# Patient Record
Sex: FEMALE | Race: WHITE | URBAN - METROPOLITAN AREA
[De-identification: names, ages, dates, MRNs, and addresses within clinical notes are randomized per-mention and may not be internally consistent; named-entity substitution may affect disease eponyms.]

---

## 2022-04-07 PROCEDURE — 88312 SPECIAL STAINS GROUP 1: CPT

## 2022-04-07 PROCEDURE — 88305 TISSUE EXAM BY PATHOLOGIST: CPT

## 2022-04-08 ENCOUNTER — HOSPITAL ENCOUNTER (OUTPATIENT)
Dept: LAB | Age: 27
Discharge: HOME | End: 2022-04-08

## 2022-04-21 ENCOUNTER — TRANSCRIBE ORDER (OUTPATIENT)
Dept: SCHEDULING | Age: 27
End: 2022-04-21

## 2022-04-21 DIAGNOSIS — R11.2 NAUSEA WITH VOMITING, UNSPECIFIED: Primary | ICD-10-CM

## 2022-07-12 ENCOUNTER — OFFICE VISIT (OUTPATIENT)
Dept: OCCUPATIONAL MEDICINE | Age: 27
End: 2022-07-12

## 2022-07-12 DIAGNOSIS — K21.9 GASTROESOPHAGEAL REFLUX DISEASE WITHOUT ESOPHAGITIS: Primary | ICD-10-CM

## 2022-07-12 DIAGNOSIS — Z30.9 ENCOUNTER FOR CONTRACEPTIVE MANAGEMENT, UNSPECIFIED TYPE: ICD-10-CM

## 2022-07-12 PROCEDURE — 99213 OFFICE O/P EST LOW 20 MIN: CPT | Performed by: NURSE PRACTITIONER

## 2022-07-12 RX ORDER — PANTOPRAZOLE SODIUM 40 MG/1
40 TABLET, DELAYED RELEASE ORAL 2 TIMES DAILY WITH MEALS
Qty: 60 TABLET | Refills: 5 | Status: SHIPPED | OUTPATIENT
Start: 2022-07-12 | End: 2023-01-08

## 2022-07-12 RX ORDER — PANTOPRAZOLE SODIUM 40 MG/1
40 TABLET, DELAYED RELEASE ORAL DAILY
COMMUNITY
Start: 2022-01-23 | End: 2022-07-12 | Stop reason: SDUPTHER

## 2022-07-18 ASSESSMENT — ENCOUNTER SYMPTOMS
SHORTNESS OF BREATH: 0
ABDOMINAL PAIN: 0

## 2022-07-19 NOTE — PROGRESS NOTES
PROGRESS NOTE    SUBJECTIVE:   Arlet Sarah is a 28 y.o. female seen for a follow up visit regarding Medication Refill     Pt needs med refill. Stable. No issues. Given PCP names to follow up for annual.  Given GYN names for follow up for annual.      Past Medical History, Past Surgical History, Family history, Social History, and Medications were all reviewed with the patient today and updated as necessary. Current Outpatient Medications   Medication Sig Dispense Refill    norethindrone-ethinyl estradiol-Fe (LO LOESTRIN FE) 1 MG-10 MCG / 10 MCG tablet Take 1 tablet by mouth daily 1 packet 11    pantoprazole (PROTONIX) 40 MG tablet Take 1 tablet by mouth 2 times daily (with meals) 60 tablet 5    ondansetron (ZOFRAN-ODT) 8 MG TBDP disintegrating tablet Take 8 mg by mouth every 8 hours as needed      sertraline (ZOLOFT) 25 MG tablet Take 25 mg by mouth daily       No current facility-administered medications for this visit. Not on File  There is no problem list on file for this patient. No past medical history on file. No past surgical history on file. No family history on file. Social History     Tobacco Use    Smoking status: Never    Smokeless tobacco: Never   Substance Use Topics    Alcohol use: Yes       Review of Systems   Eyes:  Negative for visual disturbance. Respiratory:  Negative for shortness of breath. Cardiovascular:  Negative for chest pain, palpitations and leg swelling. Gastrointestinal:  Negative for abdominal pain. Neurological:  Negative for dizziness, syncope and light-headedness. OBJECTIVE:  There were no vitals taken for this visit. Physical Exam  Constitutional:       Appearance: Normal appearance. Cardiovascular:      Rate and Rhythm: Normal rate and regular rhythm. Pulmonary:      Effort: Pulmonary effort is normal.      Breath sounds: Normal breath sounds. Neurological:      Mental Status: She is alert.           ASSESSMENT and Dee Vergara was seen today for medication refill. Diagnoses and all orders for this visit:    Gastroesophageal reflux disease without esophagitis    Encounter for contraceptive management, unspecified type    Other orders  -     norethindrone-ethinyl estradiol-Fe (LO LOESTRIN FE) 1 MG-10 MCG / 10 MCG tablet; Take 1 tablet by mouth daily  -     pantoprazole (PROTONIX) 40 MG tablet; Take 1 tablet by mouth 2 times daily (with meals)        Current treatment plan is effective, no change in therapy. Reviewed diet, exercise and weight control. Reviewed potential future medication changes and side effects. RTC as needed.

## 2022-08-04 ENCOUNTER — TELEMEDICINE (OUTPATIENT)
Dept: PRIMARY CARE CLINIC | Facility: CLINIC | Age: 27
End: 2022-08-04
Payer: COMMERCIAL

## 2022-08-04 DIAGNOSIS — F90.2 ATTENTION DEFICIT HYPERACTIVITY DISORDER (ADHD), COMBINED TYPE: ICD-10-CM

## 2022-08-04 DIAGNOSIS — F51.01 PRIMARY INSOMNIA: ICD-10-CM

## 2022-08-04 DIAGNOSIS — Z76.89 ENCOUNTER TO ESTABLISH CARE: Primary | ICD-10-CM

## 2022-08-04 DIAGNOSIS — Z79.899 ENCOUNTER FOR LONG-TERM (CURRENT) USE OF MEDICATIONS: ICD-10-CM

## 2022-08-04 PROCEDURE — 99203 OFFICE O/P NEW LOW 30 MIN: CPT | Performed by: FAMILY MEDICINE

## 2022-08-04 RX ORDER — ESZOPICLONE 3 MG/1
3 TABLET, FILM COATED ORAL NIGHTLY PRN
Qty: 30 TABLET | Refills: 5 | Status: SHIPPED | OUTPATIENT
Start: 2022-08-04 | End: 2022-09-03

## 2022-08-04 RX ORDER — ATOMOXETINE 80 MG/1
80 CAPSULE ORAL DAILY
Qty: 30 CAPSULE | Refills: 5 | Status: SHIPPED | OUTPATIENT
Start: 2022-08-04 | End: 2022-08-21 | Stop reason: SINTOL

## 2022-08-04 SDOH — ECONOMIC STABILITY: FOOD INSECURITY: WITHIN THE PAST 12 MONTHS, YOU WORRIED THAT YOUR FOOD WOULD RUN OUT BEFORE YOU GOT MONEY TO BUY MORE.: NEVER TRUE

## 2022-08-04 SDOH — ECONOMIC STABILITY: FOOD INSECURITY: WITHIN THE PAST 12 MONTHS, THE FOOD YOU BOUGHT JUST DIDN'T LAST AND YOU DIDN'T HAVE MONEY TO GET MORE.: NEVER TRUE

## 2022-08-04 ASSESSMENT — PATIENT HEALTH QUESTIONNAIRE - PHQ9
SUM OF ALL RESPONSES TO PHQ QUESTIONS 1-9: 0
1. LITTLE INTEREST OR PLEASURE IN DOING THINGS: 0
2. FEELING DOWN, DEPRESSED OR HOPELESS: 0
SUM OF ALL RESPONSES TO PHQ9 QUESTIONS 1 & 2: 0

## 2022-08-04 ASSESSMENT — SOCIAL DETERMINANTS OF HEALTH (SDOH): HOW HARD IS IT FOR YOU TO PAY FOR THE VERY BASICS LIKE FOOD, HOUSING, MEDICAL CARE, AND HEATING?: NOT HARD AT ALL

## 2022-08-04 NOTE — PROGRESS NOTES
Brandon Owusu M.D.  [address] [facility]  [place]  Phone:  [phone]  Fax:  [phone]          I was in the office while conducting this encounter. The patient was at home. CHIEF COMPLAINT:  Chief Complaint   Patient presents with    New Patient     Patient recently moved here from [facility]. 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 ADHD     Pt feels like she has had add since childhood. She feels she has been mis-diagnosed in the past and treated for anxiety and depression and those medications were never helpful for her. She is not able to multitask, she has a hard time focusing and is not able to complete any task at home or work. Insomnia     Pt c/o severe insomnia x5 years. Wakes up 10-15 times per night. Has tried Melatonin, trazodone, anxiety meds and a small dose of lunesta. HISTORY OF PRESENT ILLNESS:  Ms. Asad Moreland is a 28 y.o. female  who presents as a new patient. She recently moved here from [facility]. 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 She believes that she had ADHD. She believes she had it as a child as well. She has never been treated. She was told that she had anxiety and depression and was given anxiolytics and antidepressants. None of those helped. She has trouble focusing and finishing her tasks at work. This causes her anxiety and frustration. She feels like she is bouncing from one task to another. She may start to do laundry, go to the refrigerator to get something and realize it needs to be cleaned so start doing that. She also has problems with insomnia. Patient says she wakes up 10 to 15 times at night. She only gets about 3 hours of sleep each night. She has tried  Melatonin, Trazodone and Lunesta. She remembers the [facility] helping some. No other complaints. Taking medications as prescribed. Medications reviewed.      HISTORY:  No Known Allergies      REVIEW OF SYSTEMS:  Review of systems is as indicated in HPI otherwise negative.     PHYSICAL EXAM:    Vital Signs: (As obtained by patient/caregiver at home)  Patient-Reported Vitals 8/4/2022   Patient-Reported Weight 104 lbs   Patient-Reported Height -   Patient-Reported Systolic -   Patient-Reported Diastolic -           Constitutional: [x] Appears well-developed and well-nourished [x] No apparent distress      [] Abnormal -     Mental status: [x] Alert and awake  [x] Oriented to person/place/time [x] Able to follow commands    [] Abnormal -     Eyes:   EOM    [x]  Normal    [] Abnormal -   Sclera  [x]  Normal    [] Abnormal -          Discharge [x]  None visible   [] Abnormal -     HENT: [x] Normocephalic, atraumatic  [] Abnormal -   [x] Mouth/Throat: Mucous membranes are moist    External Ears [x] Normal  [] Abnormal -    Neck: [x] No visualized mass [] Abnormal -     Pulmonary/Chest: [x] Respiratory effort normal   [x] No visualized signs of difficulty breathing or respiratory distress             Musculoskeletal:   [x] Normal gait with no signs of ataxia         [x] Normal range of motion of neck        [] Abnormal -     Neurological:        [x] No Facial Asymmetry (Cranial nerve 7 motor function) (limited exam due to video visit)          [x] No gaze palsy        [] Abnormal -          Skin:        [x] No significant exanthematous lesions or discoloration noted on facial skin         [] Abnormal -            Psychiatric:       [x] Normal Affect [] Abnormal -        [x] No Hallucinations    PHQ:  PHQ-9  8/4/2022   Little interest or pleasure in doing things 0   Little interest or pleasure in doing things -   Feeling down, depressed, or hopeless 0   Trouble falling or staying asleep, or sleeping too much -   Feeling tired or having little energy -   Poor appetite, weight loss, or overeating -   Feeling bad about yourself - or that you are a failure or have let yourself or your family down - Trouble concentrating on things such as school, work, reading, or watching TV -   Moving or speaking so slowly that other people could have noticed; or the opposite being so fidgety that others notice -   Thoughts of being better off dead, or hurting yourself in some way -   PHQ-2 Score 0   Total Score PHQ 2 -   PHQ-9 Total Score 0   PHQ 9 Score -   How difficult have these problems made it for you to do your work, take care of your home and get along with others -       LABS  No results found for this visit on 08/04/22. No results found for any previous visit. IMPRESSION/PLAN     Diagnosis Orders   1. Encounter to establish care        2. Primary insomnia  eszopiclone 3 MG TABS      3. Attention deficit hyperactivity disorder (ADHD), combined type  atomoxetine (STRATTERA) 80 MG capsule      4. Encounter for long-term (current) use of medications            Follow up and Dispositions:  Return in about 4 months (around 12/4/2022). Patient will continue current medications. Will add the following medications: Strattera 80 mg daily for ADHD and Lunesta 3 mg daily for insomnia. Reviewed medications and side effects in detail. Reviewed most recent labs. Reviewed diet, exercise and weight control. [facility] PRESCRIPTION DRUG MONITORING SEARCH DONE. PATIENT IS IN COMPLIANCE. Consent: This patient and/or their healthcare decision maker is aware that this patient-initiated Telehealth encounter is a billable service, with coverage as determined by their insurance carrier. Patient is aware that they may receive a bill and has provided verbal consent to proceed: Yes     The patient is being evaluated by a Virtual Visit (video visit) encounter to address concerns as mentioned above. A caregiver was present when appropriate.  Due to this being a TeleHealth encounter (During [place] [address] public health emergency), evaluation of the following organ systems was limited: Vitals/Constitutional/EENT/Resp/CV/GI//MS/Neuro/Skin/Heme-Lymph-Imm. Pursuant to the emergency declaration under the [address] [place] [address] and the [employer] and [employer] Act, this Virtual Visit was conducted with patient's (and/or legal guardian's) consent, to reduce the patient's risk of exposure to COVID-19 and provide necessary medical care. The patient (and/or legal guardian) has also been advised to contact this office for worsening conditions or problems, and seek emergency medical treatment and/or call 911 if deemed necessary. Patient identification was verified at the start of the visit: YES    Services were provided through a video synchronous discussion virtually to substitute for in-person clinic visit. Patient and provider were located at their individual homes. Jose Armando Gan, was evaluated through a synchronous (real-time) audio-video encounter. The patient (or guardian if applicable) is aware that this is a billable service, which includes applicable co-pays. This Virtual Visit was conducted with patient's (and/or legal guardian's) consent. The visit was conducted pursuant to the emergency declaration under the [address] [place] [address] and the [employer] Act. Patient identification was verified, and a caregiver was present when appropriate. The patient was located at Home: [facility]. Provider was located at [address]): [place] [address].. Total Time: minutes: 11-20 minutes. Dictated using voice recognition software.  Proofread, but unrecognized voice recognition errors may exist.    Catrachita Horton MD  08/04/22

## 2022-08-15 ENCOUNTER — TELEPHONE (OUTPATIENT)
Dept: PRIMARY CARE CLINIC | Facility: CLINIC | Age: 27
End: 2022-08-15

## 2022-08-16 NOTE — TELEPHONE ENCOUNTER
Patient states that Margoth Ambriz is causing severe nausea and vomiting, Zofran is somewhat helpful but she does not want to have to take that everyday. Patient states the medication was starting to help her symptoms but cant tolerate the side effect. Is there a different medication she could try?   [facility], [facility]

## 2022-08-18 NOTE — TELEPHONE ENCOUNTER
Can try a low dose of Vyvanse but she will need to come in and sign the controlled substance agreement form.

## 2022-08-21 DIAGNOSIS — F90.2 ATTENTION DEFICIT HYPERACTIVITY DISORDER (ADHD), COMBINED TYPE: Primary | ICD-10-CM

## 2022-08-23 ENCOUNTER — TELEPHONE (OUTPATIENT)
Dept: PRIMARY CARE CLINIC | Facility: CLINIC | Age: 27
End: 2022-08-23

## 2022-08-23 NOTE — TELEPHONE ENCOUNTER
Patient states the pharmacy told her she could not  her Vyvance- that there is a one month hold on this medication. Advised patient to call pharmacy back for more information regarding this.

## 2022-09-26 ENCOUNTER — TELEPHONE (OUTPATIENT)
Dept: PRIMARY CARE CLINIC | Facility: CLINIC | Age: 27
End: 2022-09-26

## 2022-09-27 ENCOUNTER — TELEPHONE (OUTPATIENT)
Dept: PRIMARY CARE CLINIC | Facility: CLINIC | Age: 27
End: 2022-09-27

## 2022-09-27 DIAGNOSIS — F90.2 ATTENTION DEFICIT HYPERACTIVITY DISORDER (ADHD), COMBINED TYPE: Primary | ICD-10-CM

## 2022-09-27 NOTE — TELEPHONE ENCOUNTER
Patient had a visit last month and was prescribed vyvanse. She said it is working great. She was told to let you know if it works and you would send in more refills.  Requesting it be sent to [facility]

## 2022-12-06 ENCOUNTER — TELEPHONE (OUTPATIENT)
Dept: PRIMARY CARE CLINIC | Facility: CLINIC | Age: 27
End: 2022-12-06

## 2022-12-06 DIAGNOSIS — F90.2 ATTENTION DEFICIT HYPERACTIVITY DISORDER (ADHD), COMBINED TYPE: ICD-10-CM

## 2022-12-06 DIAGNOSIS — F51.01 PRIMARY INSOMNIA: Primary | ICD-10-CM

## 2022-12-06 RX ORDER — ESZOPICLONE 3 MG/1
TABLET, FILM COATED ORAL
Qty: 30 TABLET | Refills: 5 | Status: SHIPPED | OUTPATIENT
Start: 2022-12-06 | End: 2023-01-05

## 2022-12-06 RX ORDER — ESZOPICLONE 3 MG/1
TABLET, FILM COATED ORAL
COMMUNITY
Start: 2022-11-04 | End: 2022-12-06 | Stop reason: SDUPTHER

## 2022-12-20 ENCOUNTER — TELEPHONE (OUTPATIENT)
Dept: PRIMARY CARE CLINIC | Facility: CLINIC | Age: 27
End: 2022-12-20

## 2022-12-20 NOTE — TELEPHONE ENCOUNTER
Called Desean Crum to reschedule appt - she did not answer so I left a message for her to call back        ----- Message from Evelin Fontaine sent at 12/19/2022  4:31 PM EST -----  Subject: Appointment Request    Reason for Call: Established Patient Appointment needed: Routine Existing   Condition Follow Up    QUESTIONS    Reason for appointment request? Available appointments did not meet   patient need     Additional Information for Provider? Patient has to r/s upcoming   appointment tomorrow 12/20/2022 due to attending two funerals. Patient   would like schedule a follow-up for medication that was prescribed   6-months. However, she will not have enough meds to last until the time we   have available 1/23/2023.  Available Tuesday and Friday anytime, screened   green  ---------------------------------------------------------------------------  --------------  Kadie Bolivar INFO  [phone]; OK to leave message on voicemail  ---------------------------------------------------------------------------  --------------  SCRIPT ANSWERS  COVID Screen: Rodo Mohan

## 2022-12-28 ENCOUNTER — TELEPHONE (OUTPATIENT)
Dept: PRIMARY CARE CLINIC | Facility: CLINIC | Age: 27
End: 2022-12-28

## 2022-12-28 NOTE — TELEPHONE ENCOUNTER
Pt has appt on 1/10 but will be out of lunesta and vyvanse.  She is requesting a refill to help her make it to the appt

## 2022-12-29 NOTE — TELEPHONE ENCOUNTER
Left message to call back. Looks like arlene has 5 refills. She may short a few days on the vyvanse.  Waiting on patient to call back

## 2023-01-10 ENCOUNTER — TELEMEDICINE (OUTPATIENT)
Dept: PRIMARY CARE CLINIC | Facility: CLINIC | Age: 28
End: 2023-01-10
Payer: COMMERCIAL

## 2023-01-10 DIAGNOSIS — F90.2 ATTENTION DEFICIT HYPERACTIVITY DISORDER (ADHD), COMBINED TYPE: Primary | ICD-10-CM

## 2023-01-10 DIAGNOSIS — Z79.899 ENCOUNTER FOR LONG-TERM (CURRENT) USE OF MEDICATIONS: ICD-10-CM

## 2023-01-10 PROCEDURE — 99213 OFFICE O/P EST LOW 20 MIN: CPT | Performed by: FAMILY MEDICINE

## 2023-01-10 NOTE — PROGRESS NOTES
Fer Dee M.D.  [address] [facility]  [place]  Phone:  [phone]  Fax:  [phone]          I was in the office while conducting this encounter. The patient was at home. CHIEF COMPLAINT:  Chief Complaint   Patient presents with    ADHD     She has ADD and takes Vyvanse 20 mg daily. She is doing well with the medication. She denies chest pain, shortness of breath, headaches or blurred vision. HISTORY OF PRESENT ILLNESS:  Ms. Eloise Schwab is a 28 y.o. female  who presents for follow up. She has ADD and takes Vyvanse 20 mg daily. She is doing well with the medication. She denies chest pain, shortness of breath, headaches or blurred vision. No other complaints. Taking medications as prescribed. Medications reviewed and updated. HISTORY:  No Known Allergies      REVIEW OF SYSTEMS:  Review of systems is as indicated in HPI otherwise negative.     PHYSICAL EXAM:    Vital Signs: (As obtained by patient/caregiver at home)  Patient-Reported Vitals 8/4/2022   Patient-Reported Weight 104 lbs   Patient-Reported Height -   Patient-Reported Systolic -   Patient-Reported Diastolic -           PHQ:  PHQ-9  8/4/2022   Little interest or pleasure in doing things 0   Little interest or pleasure in doing things -   Feeling down, depressed, or hopeless 0   Trouble falling or staying asleep, or sleeping too much -   Feeling tired or having little energy -   Poor appetite, weight loss, or overeating -   Feeling bad about yourself - or that you are a failure or have let yourself or your family down -   Trouble concentrating on things such as school, work, reading, or watching TV -   Moving or speaking so slowly that other people could have noticed; or the opposite being so fidgety that others notice -   Thoughts of being better off dead, or hurting yourself in some way -   PHQ-2 Score 0   Total Score PHQ 2 -   PHQ-9 Total Score 0   PHQ 9 Score -   How difficult have these problems made it for you to do your work, take care of your home and get along with others -       LABS  No results found for this visit on 01/10/23. No results found for any previous visit. IMPRESSION/PLAN     Diagnosis Orders   1. Attention deficit hyperactivity disorder (ADHD), combined type  Lisdexamfetamine Dimesylate (VYVANSE) 20 MG CAPS    Lisdexamfetamine Dimesylate (VYVANSE) 20 MG CAPS    Lisdexamfetamine Dimesylate (VYVANSE) 20 MG CAPS      2. Encounter for long-term (current) use of medications            Follow up and Dispositions:  Return in 3 months (on 4/10/2023). Patient is stable on medications and will continue current medications. Refilled the above medications. Reviewed medications and side effects in detail. Reviewed most recent labs. [facility] PRESCRIPTION DRUG MONITORING SEARCH DONE. PATIENT IS IN COMPLIANCE. Consent: This patient and/or their healthcare decision maker is aware that this patient-initiated Telehealth encounter is a billable service, with coverage as determined by their insurance carrier. Patient is aware that they may receive a bill and has provided verbal consent to proceed: Yes     The patient is being evaluated by a Virtual Visit (video visit) encounter to address concerns as mentioned above. A caregiver was present when appropriate. Due to this being a TeleHealth encounter (During [facility] public health emergency), evaluation of the following organ systems was limited: Vitals/Constitutional/EENT/Resp/CV/GI//MS/Neuro/Skin/Heme-Lymph-Imm. Pursuant to the emergency declaration under the [address] [place] [address], [address] [place] [address] authority and the [employer] and [employer] Act, this Virtual Visit was conducted with patient's (and/or legal guardian's) consent, to reduce the patient's risk of exposure to COVID-19 and provide necessary medical care.   The patient (and/or legal guardian) has also been advised to contact this office for worsening conditions or problems, and seek emergency medical treatment and/or call 911 if deemed necessary. Patient identification was verified at the start of the visit: YES    Services were provided through a video synchronous discussion virtually to substitute for in-person clinic visit. Patient and provider were located at their individual homes. Moriah Bird, was evaluated through a synchronous (real-time) audio-video encounter. The patient (or guardian if applicable) is aware that this is a billable service, which includes applicable co-pays. This Virtual Visit was conducted with patient's (and/or legal guardian's) consent. The visit was conducted pursuant to the emergency declaration under the [address] [place] [address], [address] [place] [address] authority and the [employer] General Act. Patient identification was verified, and a caregiver was present when appropriate. The patient was located at Home: [facility]. Provider was located at [facility] ([address] [place] [address]): [address].. Total Time: minutes: 5-10 minutes. Dictated using voice recognition software.  Proofread, but unrecognized voice recognition errors may exist.    Yang Urias MD  01/10/23

## 2023-03-07 ENCOUNTER — APPOINTMENT (RX ONLY)
Dept: URBAN - METROPOLITAN AREA CLINIC 329 | Facility: CLINIC | Age: 28
Setting detail: DERMATOLOGY
End: 2023-03-07

## 2023-03-07 DIAGNOSIS — L98.8 OTHER SPECIFIED DISORDERS OF THE SKIN AND SUBCUTANEOUS TISSUE: ICD-10-CM

## 2023-03-07 PROCEDURE — ? BOTOX (U OR CC)

## 2023-03-07 NOTE — PROCEDURE: BOTOX (U OR CC)
Price (Use Numbers Only, No Special Characters Or $): 333 Price (Use Numbers Only, No Special Characters Or $): 955

## 2023-03-14 ENCOUNTER — TELEPHONE (OUTPATIENT)
Dept: PRIMARY CARE CLINIC | Facility: CLINIC | Age: 28
End: 2023-03-14

## 2023-03-14 NOTE — TELEPHONE ENCOUNTER
Requesting a refill for Vyvanse. Also questioning why refills are never sent to pharmacy for this medication. Send to [facility].

## 2023-03-14 NOTE — TELEPHONE ENCOUNTER
Patient should have 1 more refill. Spoke with patient, discussed that medication is a controlled substance and 3 rx are sent at one time. Patient will call pharmacy and call back if needed.

## 2023-06-06 ENCOUNTER — APPOINTMENT (RX ONLY)
Dept: URBAN - METROPOLITAN AREA CLINIC 329 | Facility: CLINIC | Age: 28
Setting detail: DERMATOLOGY
End: 2023-06-06

## 2023-06-06 DIAGNOSIS — L98.8 OTHER SPECIFIED DISORDERS OF THE SKIN AND SUBCUTANEOUS TISSUE: ICD-10-CM

## 2023-06-06 PROCEDURE — ? BOTOX (U OR CC)

## 2023-06-06 NOTE — PROCEDURE: BOTOX (U OR CC)
Price (Use Numbers Only, No Special Characters Or $): 192 Price (Use Numbers Only, No Special Characters Or $): 160

## 2023-07-11 DIAGNOSIS — F90.2 ATTENTION DEFICIT HYPERACTIVITY DISORDER (ADHD), COMBINED TYPE: ICD-10-CM

## 2023-07-14 DIAGNOSIS — F51.01 PRIMARY INSOMNIA: ICD-10-CM

## 2023-07-14 RX ORDER — ESZOPICLONE 3 MG/1
TABLET, FILM COATED ORAL
Qty: 30 TABLET | Refills: 0 | Status: SHIPPED | OUTPATIENT
Start: 2023-07-14 | End: 2023-08-14